# Patient Record
Sex: MALE | Race: WHITE | NOT HISPANIC OR LATINO | Employment: STUDENT | URBAN - METROPOLITAN AREA
[De-identification: names, ages, dates, MRNs, and addresses within clinical notes are randomized per-mention and may not be internally consistent; named-entity substitution may affect disease eponyms.]

---
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
## 2020-01-01 NOTE — PROGRESS NOTES
Assessment/Plan:    Concussion with no loss of consciousness   Patient has returned to school   patient is to remain out of the pool   Until Monday   mom and Paul Soliz made aware of the red flags of a head injury  If any of those red flags occur there to proceed immediately to the emergency room   may use Motrin for the headache that fin  e            Subjective:      Patient ID: Carlitos Figueroa is a 16 y o  male  Patient here for follow-up head injury at [employer] work [employer] pool   was getting out of the pool and banged his head on the diving block   patient reports no loss of consciousness but then was hard enough that he fell backwards into the pool got himself together then climbed out of the pool   once out of the pool he had to sit down again because he felt a little dizzy   he did have a laceration small the laceration on the crown of his head which did bleed   this happened 6 days ago   he did have an evaluation on January 26  And he has been out of the pool since   currently he reports no nausea no blurry vision no visual changes no gait disturbances  The only complaint he has at present is a headache   the patient reports headache a level 3 on a 0-10 scale            Review of Systems   Constitutional: Negative for activity change, fatigue, fever and unexpected weight change  HENT: Negative for congestion, ear pain, hearing loss, sinus pain, sore throat, tinnitus, trouble swallowing and voice change  Eyes: Negative for pain, discharge and visual disturbance  Respiratory: Negative for cough, chest tightness, shortness of breath and wheezing  Cardiovascular: Negative for chest pain, palpitations and leg swelling  Gastrointestinal: Negative for abdominal pain, blood in stool, diarrhea and vomiting  Endocrine: Negative for cold intolerance, heat intolerance and polyuria  Genitourinary: Negative for difficulty urinating, dysuria, flank pain, genital sores and urgency  Musculoskeletal: Negative for gait problem, joint swelling and neck stiffness  Skin: Negative for color change, rash and wound  Allergic/Immunologic: Negative for immunocompromised state  Neurological: Positive for headaches  Negative for syncope, speech difficulty and light-headedness  Seizures:  3  I was 0-10 scale headache  Psychiatric/Behavioral: Negative for behavioral problems, dysphoric mood and suicidal ideas  Objective:     Physical Exam   Constitutional: He is oriented to person, place, and time  He appears well-developed and well-nourished  HENT:   Head: Normocephalic and atraumatic  Eyes: Pupils are equal, round, and reactive to light  Neck: Normal range of motion  Neck supple  Cardiovascular: Normal rate and normal heart sounds  No murmur heard  Pulmonary/Chest: Effort normal and breath sounds normal  No respiratory distress  He exhibits no tenderness  Abdominal: Soft  Bowel sounds are normal    Musculoskeletal: Normal range of motion  He exhibits no tenderness  Lymphadenopathy:     He has no cervical adenopathy  Neurological: He is alert and oriented to person, place, and time  He displays normal reflexes  No cranial nerve deficit  He exhibits normal muscle tone  Cranial nerves 2-12 intact   Skin: Skin is warm and dry  Psychiatric: He has a normal mood and affect  Thought content normal    Nursing note and vitals reviewed  Social History     Social History    Marital status: Single     Spouse name: N/A    Number of children: N/A    Years of education: N/A     Occupational History    Not on file  Social History Main Topics    Smoking status: Not on file    Smokeless tobacco: Not on file    Alcohol use Not on file    Drug use: Unknown    Sexual activity: Not on file     Other Topics Concern    Not on file     Social History Narrative    No narrative on file     No past medical history on file  No current outpatient prescriptions on file    No family history on file  Statement Selected

## 2020-09-24 ENCOUNTER — TELEPHONE (OUTPATIENT)
Dept: FAMILY MEDICINE CLINIC | Facility: CLINIC | Age: 20
End: 2020-09-24

## 2020-09-24 NOTE — TELEPHONE ENCOUNTER
Pt's mom called to schedule pt for flu shot  Upon reviewing pt appt history, I notified mom that pt is overdue for an office visit for an annual wellness visit  Mom declined to make appt to bring pt up to date with wellness visit and stated that she will just go to the pharmacy

## 2022-04-21 ENCOUNTER — HOSPITAL ENCOUNTER (EMERGENCY)
Facility: HOSPITAL | Age: 22
Discharge: HOME | End: 2022-04-21
Attending: EMERGENCY MEDICINE
Payer: COMMERCIAL

## 2022-04-21 ENCOUNTER — APPOINTMENT (EMERGENCY)
Dept: RADIOLOGY | Facility: HOSPITAL | Age: 22
End: 2022-04-21
Payer: COMMERCIAL

## 2022-04-21 VITALS
OXYGEN SATURATION: 100 % | SYSTOLIC BLOOD PRESSURE: 132 MMHG | HEART RATE: 69 BPM | RESPIRATION RATE: 19 BRPM | TEMPERATURE: 97.8 F | DIASTOLIC BLOOD PRESSURE: 74 MMHG

## 2022-04-21 DIAGNOSIS — R07.9 CHEST PAIN, UNSPECIFIED TYPE: Primary | ICD-10-CM

## 2022-04-21 LAB
ALBUMIN SERPL BCP-MCNC: 4.5 G/DL (ref 3.5–5)
ALP SERPL-CCNC: 80 U/L (ref 46–116)
ALT SERPL W P-5'-P-CCNC: 27 U/L (ref 12–78)
ANION GAP SERPL CALCULATED.3IONS-SCNC: 7 MMOL/L (ref 4–13)
AST SERPL W P-5'-P-CCNC: 22 U/L (ref 5–45)
BASOPHILS # BLD AUTO: 0.05 THOUSANDS/ΜL (ref 0–0.1)
BASOPHILS NFR BLD AUTO: 1 % (ref 0–1)
BILIRUB SERPL-MCNC: 1.18 MG/DL (ref 0.2–1)
BUN SERPL-MCNC: 13 MG/DL (ref 5–25)
CALCIUM SERPL-MCNC: 9.3 MG/DL (ref 8.3–10.1)
CARDIAC TROPONIN I PNL SERPL HS: <2 NG/L
CHLORIDE SERPL-SCNC: 104 MMOL/L (ref 100–108)
CO2 SERPL-SCNC: 27 MMOL/L (ref 21–32)
CREAT SERPL-MCNC: 0.81 MG/DL (ref 0.6–1.3)
D DIMER PPP FEU-MCNC: <0.27 UG/ML FEU
EOSINOPHIL # BLD AUTO: 0.16 THOUSAND/ΜL (ref 0–0.61)
EOSINOPHIL NFR BLD AUTO: 2 % (ref 0–6)
ERYTHROCYTE [DISTWIDTH] IN BLOOD BY AUTOMATED COUNT: 12 % (ref 11.6–15.1)
GFR SERPL CREATININE-BSD FRML MDRD: 127 ML/MIN/1.73SQ M
GLUCOSE SERPL-MCNC: 88 MG/DL (ref 65–140)
HCT VFR BLD AUTO: 39.5 % (ref 36.5–49.3)
HGB BLD-MCNC: 14.4 G/DL (ref 12–17)
IMM GRANULOCYTES # BLD AUTO: 0.02 THOUSAND/UL (ref 0–0.2)
IMM GRANULOCYTES NFR BLD AUTO: 0 % (ref 0–2)
LYMPHOCYTES # BLD AUTO: 2.17 THOUSANDS/ΜL (ref 0.6–4.47)
LYMPHOCYTES NFR BLD AUTO: 32 % (ref 14–44)
MCH RBC QN AUTO: 30.1 PG (ref 26.8–34.3)
MCHC RBC AUTO-ENTMCNC: 36.5 G/DL (ref 31.4–37.4)
MCV RBC AUTO: 83 FL (ref 82–98)
MONOCYTES # BLD AUTO: 0.67 THOUSAND/ΜL (ref 0.17–1.22)
MONOCYTES NFR BLD AUTO: 10 % (ref 4–12)
NEUTROPHILS # BLD AUTO: 3.73 THOUSANDS/ΜL (ref 1.85–7.62)
NEUTS SEG NFR BLD AUTO: 55 % (ref 43–75)
NRBC BLD AUTO-RTO: 0 /100 WBCS
PLATELET # BLD AUTO: 273 THOUSANDS/UL (ref 149–390)
PMV BLD AUTO: 9.7 FL (ref 8.9–12.7)
POTASSIUM SERPL-SCNC: 3.8 MMOL/L (ref 3.5–5.3)
PROT SERPL-MCNC: 7.9 G/DL (ref 6.4–8.2)
RBC # BLD AUTO: 4.78 MILLION/UL (ref 3.88–5.62)
SODIUM SERPL-SCNC: 138 MMOL/L (ref 136–145)
TSH SERPL DL<=0.05 MIU/L-ACNC: 1.5 UIU/ML (ref 0.45–4.5)
WBC # BLD AUTO: 6.8 THOUSAND/UL (ref 4.31–10.16)

## 2022-04-21 PROCEDURE — 71046 X-RAY EXAM CHEST 2 VIEWS: CPT

## 2022-04-21 PROCEDURE — 84484 ASSAY OF TROPONIN QUANT: CPT | Performed by: EMERGENCY MEDICINE

## 2022-04-21 PROCEDURE — 36415 COLL VENOUS BLD VENIPUNCTURE: CPT | Performed by: EMERGENCY MEDICINE

## 2022-04-21 PROCEDURE — 84443 ASSAY THYROID STIM HORMONE: CPT | Performed by: EMERGENCY MEDICINE

## 2022-04-21 PROCEDURE — 85379 FIBRIN DEGRADATION QUANT: CPT | Performed by: EMERGENCY MEDICINE

## 2022-04-21 PROCEDURE — 99285 EMERGENCY DEPT VISIT HI MDM: CPT | Performed by: EMERGENCY MEDICINE

## 2022-04-21 PROCEDURE — 85025 COMPLETE CBC W/AUTO DIFF WBC: CPT | Performed by: EMERGENCY MEDICINE

## 2022-04-21 PROCEDURE — 93005 ELECTROCARDIOGRAM TRACING: CPT

## 2022-04-21 PROCEDURE — 80053 COMPREHEN METABOLIC PANEL: CPT | Performed by: EMERGENCY MEDICINE

## 2022-04-21 PROCEDURE — 99285 EMERGENCY DEPT VISIT HI MDM: CPT

## 2022-04-21 NOTE — Clinical Note
Sonny Castillo was seen and treated in our emergency department on 4/21/2022  No restrictions            Diagnosis:     Chiqui Moran  may return to work on return date  He may return on this date: 04/22/2022         If you have any questions or concerns, please don't hesitate to call        Sandro Garnett RN    ______________________________           _______________          _______________  Hospital Representative                              Date                                Time

## 2022-04-21 NOTE — ED PROVIDER NOTES
History  Chief Complaint   Patient presents with    Chest Pain     pt c/o intermittant cp since yesterday after work  pt states L arm numbness and pale last night, pt denies any medical hx       History provided by:  Patient  Chest Pain  Pain location:  L chest  Pain quality: dull    Pain radiates to:  L arm  Pain radiates to the back: no    Pain severity:  Mild  Onset quality:  Sudden  Duration:  10 minutes  Timing:  Intermittent  Progression:  Waxing and waning  Chronicity:  New  Context: at rest    Relieved by:  Nothing  Worsened by:  Nothing tried  Associated symptoms: palpitations    Associated symptoms: no back pain, no claudication, no cough, no diaphoresis, no fatigue, no fever, no headache, no lower extremity edema, no nausea, no shortness of breath, not vomiting and no weakness    Risk factors: smoking (THC)    Risk factors: no coronary artery disease, no diabetes mellitus, no hypertension, no immobilization and no prior DVT/PE        None       History reviewed  No pertinent past medical history  History reviewed  No pertinent surgical history  Family History   Problem Relation Age of Onset    Asthma Sister     Asthma Other     Cancer Cousin     Osteoporosis Other      I have reviewed and agree with the history as documented  E-Cigarette/Vaping     E-Cigarette/Vaping Substances    THC Yes      Social History     Tobacco Use    Smoking status: Never Smoker    Smokeless tobacco: Never Used   Vaping Use    Vaping Use: Not on file   Substance Use Topics    Alcohol use: Yes     Comment: social     Drug use: Yes     Types: Marijuana       Review of Systems   Constitutional: Negative for diaphoresis, fatigue and fever  Respiratory: Negative for cough and shortness of breath  Cardiovascular: Positive for chest pain and palpitations  Negative for claudication  Gastrointestinal: Negative for nausea and vomiting  Musculoskeletal: Negative for back pain     Neurological: Negative for weakness and headaches  All other systems reviewed and are negative  Physical Exam  Physical Exam  Vitals and nursing note reviewed  Constitutional:       General: He is not in acute distress  Appearance: He is well-developed  He is not diaphoretic  HENT:      Head: Normocephalic and atraumatic  Nose: Nose normal    Eyes:      Conjunctiva/sclera: Conjunctivae normal    Cardiovascular:      Rate and Rhythm: Normal rate and regular rhythm  Heart sounds: Normal heart sounds  Pulmonary:      Effort: Pulmonary effort is normal  No respiratory distress  Breath sounds: Normal breath sounds  Abdominal:      Palpations: Abdomen is soft  Musculoskeletal:         General: Normal range of motion  Cervical back: Normal range of motion and neck supple  Skin:     General: Skin is warm and dry  Neurological:      Mental Status: He is alert and oriented to person, place, and time  Psychiatric:         Mood and Affect: Mood is anxious           Vital Signs  ED Triage Vitals   Temperature Pulse Respirations Blood Pressure SpO2   04/21/22 1722 04/21/22 1721 04/21/22 1721 04/21/22 1721 04/21/22 1721   97 8 °F (36 6 °C) 69 18 133/76 100 %      Temp Source Heart Rate Source Patient Position - Orthostatic VS BP Location FiO2 (%)   04/21/22 1722 04/21/22 1721 04/21/22 1721 04/21/22 1721 --   Oral Monitor Sitting Left arm       Pain Score       --                  Vitals:    04/21/22 1721   BP: 133/76   Pulse: 69   Patient Position - Orthostatic VS: Sitting         Visual Acuity      ED Medications  Medications - No data to display    Diagnostic Studies  Results Reviewed     Procedure Component Value Units Date/Time    D-Dimer [[id number]]  (Normal) Collected: 04/2000    Lab Status: Final result Specimen: Blood from Arm, Right Updated: 04/21/22 2105     D-Dimer, Quant <0 27 ug/ml FEU     TSH [[id number]]  (Normal) Collected: 04/2000    Lab Status: Final result Specimen: Blood from Arm, Right Updated: 04/21/22 2035     TSH 3RD GENERATON 1 496 uIU/mL     Narrative:      Patients undergoing fluorescein dye angiography may retain small amounts of fluorescein in the body for 48-72 hours post procedure  Samples containing fluorescein can produce falsely depressed TSH values  If the patient had this procedure,a specimen should be resubmitted post fluorescein clearance        HS Troponin 0hr (reflex protocol) [[id number]]  (Normal) Collected: 04/2000    Lab Status: Final result Specimen: Blood from Arm, Right Updated: 04/21/22 2032     hs TnI 0hr <2 ng/L     Comprehensive metabolic panel [[id number]]  (Abnormal) Collected: 04/2000    Lab Status: Final result Specimen: Blood from Arm, Right Updated: 04/21/22 2032     Sodium 138 mmol/L      Potassium 3 8 mmol/L      Chloride 104 mmol/L      CO2 27 mmol/L      ANION GAP 7 mmol/L      BUN 13 mg/dL      Creatinine 0 81 mg/dL      Glucose 88 mg/dL      Calcium 9 3 mg/dL      AST 22 U/L      ALT 27 U/L      Alkaline Phosphatase 80 U/L      Total Protein 7 9 g/dL      Albumin 4 5 g/dL      Total Bilirubin 1 18 mg/dL      eGFR 127 ml/min/1 73sq m     Narrative:      [place] guidelines for Chronic Kidney Disease (CKD):     Stage 1 with normal or high GFR (GFR > 90 mL/min/1 73 square meters)    Stage 2 Mild CKD (GFR = 60-89 mL/min/1 73 square meters)    Stage 3A Moderate CKD (GFR = 45-59 mL/min/1 73 square meters)    Stage 3B Moderate CKD (GFR = 30-44 mL/min/1 73 square meters)    Stage 4 Severe CKD (GFR = 15-29 mL/min/1 73 square meters)    Stage 5 End Stage CKD (GFR <15 mL/min/1 73 square meters)  Note: GFR calculation is accurate only with a steady state creatinine    CBC and differential [[id number]] Collected: 04/2000    Lab Status: Final result Specimen: Blood from Arm, Right Updated: 04/21/22 2007     WBC 6 80 Thousand/uL      RBC 4 78 Million/uL      Hemoglobin 14 4 g/dL      Hematocrit 39 5 %      MCV 83 fL MCH 30 1 pg      MCHC 36 5 g/dL      RDW 12 0 %      MPV 9 7 fL      Platelets 861 Thousands/uL      nRBC 0 /100 WBCs      Neutrophils Relative 55 %      Immat GRANS % 0 %      Lymphocytes Relative 32 %      Monocytes Relative 10 %      Eosinophils Relative 2 %      Basophils Relative 1 %      Neutrophils Absolute 3 73 Thousands/µL      Immature Grans Absolute 0 02 Thousand/uL      Lymphocytes Absolute 2 17 Thousands/µL      Monocytes Absolute 0 67 Thousand/µL      Eosinophils Absolute 0 16 Thousand/µL      Basophils Absolute 0 05 Thousands/µL                  XR chest 2 views   ED Interpretation by Akira Monge MD (04/21 2011)   NAD                 Procedures  ECG 12 Lead Documentation Only    Date/Time: 4/21/2022 7:30 PM  Performed by: Akira Monge MD  Authorized by: Akira Monge MD     Indications / Diagnosis:  Chest pain  ECG reviewed by me, the ED Provider: yes    Rate:     ECG rate:  56    ECG rate assessment: bradycardic    Rhythm:     Rhythm: sinus bradycardia    Conduction:     Conduction: abnormal      Abnormal conduction: incomplete RBBB               ED Course             HEART Risk Score      Most Recent Value   Heart Score Risk Calculator    History 0 Filed at: 04/21/2022 2103   ECG 0 Filed at: 04/21/2022 2103   Age 0 Filed at: 04/21/2022 2103   Risk Factors 0 Filed at: 04/21/2022 2103   Troponin 0 Filed at: 04/21/2022 2103   HEART Score 0 Filed at: 04/21/2022 2103                PERC Rule for PE      Most Recent Value   PERC Rule for PE    Age >=50 0 Filed at: 04/21/2022 2103   HR >=100 0 Filed at: 04/21/2022 2103   O2 Sat on room air < 95% 0 Filed at: 04/21/2022 2103   History of PE or DVT 0 Filed at: 04/21/2022 2103   Recent trauma or surgery 0 Filed at: 04/21/2022 2103   Hemoptysis 0 Filed at: 04/21/2022 2103   Exogenous estrogen 0 Filed at: 04/21/2022 2103   Unilateral leg swelling 0 Filed at: 04/21/2022 2103   [address]  Rule for PE Results 0 Filed at: 04/21/2022 2103              SBIRT 20yo+      Most Recent Value   SBIRT (22 yo +)    In order to provide better care to our patients, we are screening all of our patients for alcohol and drug use  Would it be okay to ask you these screening questions? Yes Filed at: 04/21/2022 1920   Initial Alcohol Screen: US AUDIT-C     1  How often do you have a drink containing alcohol? 0 Filed at: 04/21/2022 1920   2  How many drinks containing alcohol do you have on a typical day you are drinking? 0 Filed at: 04/21/2022 1920   3a  Male UNDER 65: How often do you have five or more drinks on one occasion? 0 Filed at: 04/21/2022 1920   Audit-C Score 0 Filed at: 04/21/2022 1920   ABBEY: How many times in the past year have you    Used an illegal drug or used a prescription medication for non-medical reasons? Never Filed at: 04/21/2022 1920                    MDM  Number of Diagnoses or Management Options  Chest pain, unspecified type: new and requires workup     Amount and/or Complexity of Data Reviewed  Clinical lab tests: ordered and reviewed  Tests in the radiology section of CPT®: ordered  Independent visualization of images, tracings, or specimens: yes    Risk of Complications, Morbidity, and/or Mortality  Presenting problems: high    Patient Progress  Patient progress: stable      Disposition  Final diagnoses:   Chest pain, unspecified type     Time reflects when diagnosis was documented in both MDM as applicable and the Disposition within this note     Time User Action Codes Description Comment    4/21/2022  9:03 PM Gerry Dennis 89, [address] [R07 [id number]] Chest pain, unspecified type       ED Disposition     ED Disposition Condition Date/Time Comment    Discharge Stable u Apr 21, 2022  9:03 PM [address] discharge to home/self care              Follow-up Information     Follow up With Specialties Details Why Contact Info Additional [address] [facility] Emergency Department Emergency Medicine Go to  If symptoms worsen [address] [facility] [address] [facility] [id number] [facility] Emergency Department, [address] [place], [place], [facility]          Patient's Medications    No medications on file       No discharge procedures on file      PDMP Review     None          ED Provider  Electronically Signed by           Andi Membreno MD  04/21/22 5819

## 2022-04-21 NOTE — Clinical Note
Courtney Magallon was seen and treated in our emergency department on 4/21/2022  No restrictions            Diagnosis:     ELIJAH  may return to work on return date  He may return on this date: 04/22/2022         If you have any questions or concerns, please don't hesitate to call        Thomas [place] RN    ______________________________           _______________          _______________  Hospital Representative                              Date                                Time

## 2022-04-22 LAB
ATRIAL RATE: 56 BPM
P AXIS: 65 DEGREES
PR INTERVAL: 138 MS
QRS AXIS: 94 DEGREES
QRSD INTERVAL: 100 MS
QT INTERVAL: 382 MS
QTC INTERVAL: 368 MS
T WAVE AXIS: 80 DEGREES
VENTRICULAR RATE: 56 BPM

## 2022-04-22 PROCEDURE — 93010 ELECTROCARDIOGRAM REPORT: CPT | Performed by: INTERNAL MEDICINE

## 2022-07-14 ENCOUNTER — OFFICE VISIT (OUTPATIENT)
Dept: GASTROENTEROLOGY | Facility: CLINIC | Age: 22
End: 2022-07-14
Payer: COMMERCIAL

## 2022-07-14 VITALS
WEIGHT: 132 LBS | BODY MASS INDEX: 19.55 KG/M2 | DIASTOLIC BLOOD PRESSURE: 60 MMHG | HEIGHT: 69 IN | SYSTOLIC BLOOD PRESSURE: 108 MMHG | HEART RATE: 88 BPM | OXYGEN SATURATION: 98 %

## 2022-07-14 DIAGNOSIS — R12 HEARTBURN: ICD-10-CM

## 2022-07-14 DIAGNOSIS — R14.0 BLOATING: ICD-10-CM

## 2022-07-14 DIAGNOSIS — R68.81 EARLY SATIETY: ICD-10-CM

## 2022-07-14 DIAGNOSIS — R09.89 GLOBUS SENSATION: ICD-10-CM

## 2022-07-14 DIAGNOSIS — K21.9 GASTROESOPHAGEAL REFLUX DISEASE, UNSPECIFIED WHETHER ESOPHAGITIS PRESENT: ICD-10-CM

## 2022-07-14 DIAGNOSIS — R07.89 OTHER CHEST PAIN: Primary | ICD-10-CM

## 2022-07-14 PROCEDURE — 99204 OFFICE O/P NEW MOD 45 MIN: CPT | Performed by: INTERNAL MEDICINE

## 2022-07-14 RX ORDER — PANTOPRAZOLE SODIUM 40 MG/1
40 TABLET, DELAYED RELEASE ORAL
Qty: 30 TABLET | Refills: 3 | Status: SHIPPED | OUTPATIENT
Start: 2022-07-14

## 2022-07-14 NOTE — PROGRESS NOTES
[facility]      Chief Complaint:  Heartburn and chest pain    HPI:  Bennye Claude is a 24 y o   male who presents with recent visit to the emergency room for chest pain which was found to be noncardiac  His ER visit was April 21, 2022  According to the patient about 3 and half months ago his mother left to go to during the country in an SUV  He has been on his own since then  About 2-1/2 to 3 months ago the patient began experiencing chest pain and heartburn  He has GERD symptoms with reflux into the back of his throat  He has a globus sensation  Postprandially he gets a right upper quadrant bloating discomfort  He can no longer tolerate caffeine  He notes that since his mother left his diet is changed fairly significantly  He has no dysphagia  No melena  No weight loss  No nausea or vomiting  No fever or chills  He has occasional early satiety  There is no known family history of GI disease  He is not taking nonsteroidals  There is no other definitive exacerbating or remitting factors  No nocturnal or exertional symptomatology  No shortness of breath or dizziness         Review of Systems:   Constitutional: No fever or chills, feels well, no tiredness, no recent weight gain or weight loss  HENT: No complaints of earache, no hearing loss, no nosebleeds, no nasal discharge, no sore throat, no hoarseness  Eyes: No complaints of eye pain, no red eyes, no discharge from eyes, no itchy eyes  Cardiovascular: No complaints of slow heart rate, no fast heart rate, no chest pain, no palpitations, no leg claudication, no lower extremity edema  Respiratory: No complaints of shortness of breath, no wheezing, no cough, no SOB on exertion, no orthopnea  Gastrointestinal: As noted in HPI  Genitourinary: No complaints of dysuria, no incontinence, no hesitancy, no nocturia     Musculoskeletal: No complaints of arthralgia, no myalgias, no joint swelling or stiffness, no limb pain or swelling  Neurological: No complaints of headache, no confusion, no convulsions, no numbness or tingling, no dizziness or fainting, no limb weakness, no difficulty walking  Skin: No complaints of skin rash or skin lesions, no itching, no skin wound, no dry skin  Hematological/Lymphatic: No complaints of swollen glands, does not bleed easy  Allergic/Immunologic: No immunocompromised state  Endocrine:  No complaints of polyuria, no polydipsia  Psychiatric/Behavioral: is not suicidal, no sleep disturbances, no anxiety or depression, no change in personality, no emotional problems  Historical Information   History reviewed  No pertinent past medical history  History reviewed  No pertinent surgical history  Social History   Social History     Substance and Sexual Activity   Alcohol Use Yes    Comment: social      Social History     Substance and Sexual Activity   Drug Use Yes    Types: Marijuana     Social History     Tobacco Use   Smoking Status Never Smoker   Smokeless Tobacco Never Used     Family History   Problem Relation Age of Onset    Asthma Sister     Cancer Cousin     Asthma Other     Osteoporosis Other          Current Medications: has a current medication list which includes the following prescription(s): pantoprazole  Vital Signs: /60   Pulse 88   Ht 5' 9" (1 753 m)   Wt 59 9 kg (132 lb)   SpO2 98%   BMI 19 49 kg/m²       Physical Exam:   Constitutional  General Appearance: No acute distress, well appearing and well nourished  Head  Normocephalic  Eyes  Conjunctivae and lids: No swelling, erythema, or discharge  Pupils and irises: Equal, round and reactive to light  Ears, Nose, Mouth, and Throat  External inspection of ears and nose: Normal  Nasal mucosa, septum and turbinates: Normal without edema or erythema/   Oropharynx: Normal with no erythema, edema, exudate or lesions  Neck  Normal range of motion  Neck supple  Cardiovascular  Auscultation of the heart: Normal rate and rhythm, normal S1 and S2 without murmurs  Examination of the extremities for edema and/or varicosities: Normal  Pulmonary/Chest  Respiratory effort: No increased work of breathing or signs of respiratory distress  Auscultation of lungs: Clear to auscultation, equal breath sounds bilaterally, no wheezes, rales, no rhonchi  Abdomen  Abdomen: Non-tender, no masses  No succussion splash   Liver and spleen: No hepatomegaly or splenomegaly  Musculoskeletal  Gait and station: normal   Digits and Nails: normal without clubbing or cyanosis  Inspection/palpation of joints, bones, and muscles: Normal  Neurological  No nystagmus or asterixis  Skin  Skin and subcutaneous tissue: Normal without rashes or lesions  Lymphatic  Palpation of the lymph nodes in neck: No lymphadenopathy  Psychiatric  Orientation to person, place and time: Normal   Mood and affect: Normal          Labs:  Lab Results   Component Value Date    ALT 27 04/21/2022    AST 22 04/21/2022    BUN 13 04/21/2022    CALCIUM 9 3 04/21/2022     04/21/2022    CO2 27 04/21/2022    CREATININE 0 81 04/21/2022    HCT 39 5 04/21/2022    HGB 14 4 04/21/2022     04/21/2022    K 3 8 04/21/2022    WBC 6 80 04/21/2022         X-Rays & Procedures:   No orders to display           ______________________________________________________________________      Assessment & Plan:     Diagnoses and all orders for this visit:    Other chest pain  -     pantoprazole (PROTONIX) 40 mg tablet; Take 1 tablet (40 mg total) by mouth daily before breakfast    Heartburn  -     pantoprazole (PROTONIX) 40 mg tablet; Take 1 tablet (40 mg total) by mouth daily before breakfast    Bloating  -     pantoprazole (PROTONIX) 40 mg tablet; Take 1 tablet (40 mg total) by mouth daily before breakfast    Gastroesophageal reflux disease, unspecified whether esophagitis present  -     pantoprazole (PROTONIX) 40 mg tablet;  Take 1 tablet (40 mg total) by mouth daily before breakfast    Early satiety  -     pantoprazole (PROTONIX) 40 mg tablet; Take 1 tablet (40 mg total) by mouth daily before breakfast    Globus sensation  -     pantoprazole (PROTONIX) 40 mg tablet; Take 1 tablet (40 mg total) by mouth daily before breakfast      Patient will be given a trial of Protonix  He is given instructions on how to take it  He will call me in 2 weeks with his progress  He will remain on a FODMAP diet  Further recommendations will depend on his progress